# Patient Record
Sex: MALE | Race: ASIAN | URBAN - METROPOLITAN AREA
[De-identification: names, ages, dates, MRNs, and addresses within clinical notes are randomized per-mention and may not be internally consistent; named-entity substitution may affect disease eponyms.]

---

## 2022-11-30 ENCOUNTER — OFFICE (OUTPATIENT)
Dept: URBAN - METROPOLITAN AREA CLINIC 103 | Facility: CLINIC | Age: 67
Setting detail: OPHTHALMOLOGY
End: 2022-11-30
Payer: COMMERCIAL

## 2022-11-30 DIAGNOSIS — H25.13: ICD-10-CM

## 2022-11-30 DIAGNOSIS — H35.033: ICD-10-CM

## 2022-11-30 DIAGNOSIS — H17.9: ICD-10-CM

## 2022-11-30 DIAGNOSIS — E11.9: ICD-10-CM

## 2022-11-30 PROCEDURE — 92014 COMPRE OPH EXAM EST PT 1/>: CPT | Performed by: OPHTHALMOLOGY

## 2022-11-30 PROCEDURE — 92250 FUNDUS PHOTOGRAPHY W/I&R: CPT | Performed by: OPHTHALMOLOGY

## 2022-11-30 ASSESSMENT — REFRACTION_CURRENTRX
OD_AXIS: 094
OD_CYLINDER: -1.75
OS_VPRISM_DIRECTION: BF
OD_OVR_VA: 20/
OS_AXIS: 098
OS_SPHERE: +3.00
OS_CYLINDER: -2.50
OD_VPRISM_DIRECTION: BF
OS_ADD: +2.75
OS_OVR_VA: 20/
OD_SPHERE: +3.00
OD_ADD: +2.75

## 2022-11-30 ASSESSMENT — SPHEQUIV_DERIVED
OD_SPHEQUIV: 1.875
OS_SPHEQUIV: 2.625
OD_SPHEQUIV: 2.375
OS_SPHEQUIV: 1.75

## 2022-11-30 ASSESSMENT — REFRACTION_MANIFEST
OD_VA1: 20/20-
OD_AXIS: 100
OD_VA2: 20/20-
OD_CYLINDER: -0.75
OS_VA1: 20/20-
OS_CYLINDER: -2.50
OS_AXIS: 099
OD_ADD: +2.00
OS_VA2: 20/20-
OS_ADD: +2.00
OD_SPHERE: +2.25
OS_SPHERE: +3.00

## 2022-11-30 ASSESSMENT — CONFRONTATIONAL VISUAL FIELD TEST (CVF)
OD_FINDINGS: FULL
OS_FINDINGS: FULL

## 2022-11-30 ASSESSMENT — TONOMETRY
OD_IOP_MMHG: 12
OS_IOP_MMHG: 12

## 2022-11-30 ASSESSMENT — CORNEAL SURGICAL SCARRING: OD_SCARRING: PERIPHERAL ANTERIOR

## 2022-11-30 ASSESSMENT — REFRACTION_AUTOREFRACTION
OS_CYLINDER: -3.25
OD_SPHERE: +3.25
OS_AXIS: 093
OS_SPHERE: +4.25
OD_AXIS: 097
OD_CYLINDER: -1.75

## 2022-11-30 ASSESSMENT — VISUAL ACUITY
OS_BCVA: 20/25-
OD_BCVA: 20/40

## 2024-01-30 ENCOUNTER — OFFICE (OUTPATIENT)
Dept: URBAN - METROPOLITAN AREA CLINIC 103 | Facility: CLINIC | Age: 69
Setting detail: OPHTHALMOLOGY
End: 2024-01-30
Payer: COMMERCIAL

## 2024-01-30 DIAGNOSIS — E11.3293: ICD-10-CM

## 2024-01-30 DIAGNOSIS — H11.042: ICD-10-CM

## 2024-01-30 DIAGNOSIS — H25.043: ICD-10-CM

## 2024-01-30 DIAGNOSIS — H35.033: ICD-10-CM

## 2024-01-30 DIAGNOSIS — H17.9: ICD-10-CM

## 2024-01-30 DIAGNOSIS — H25.89: ICD-10-CM

## 2024-01-30 DIAGNOSIS — H25.13: ICD-10-CM

## 2024-01-30 PROCEDURE — 92250 FUNDUS PHOTOGRAPHY W/I&R: CPT | Performed by: OPHTHALMOLOGY

## 2024-01-30 PROCEDURE — 92014 COMPRE OPH EXAM EST PT 1/>: CPT | Performed by: OPHTHALMOLOGY

## 2024-01-30 ASSESSMENT — REFRACTION_CURRENTRX
OS_OVR_VA: 20/
OD_ADD: +2.75
OD_OVR_VA: 20/
OD_VPRISM_DIRECTION: BF
OD_AXIS: 090
OS_CYLINDER: -2.50
OD_CYLINDER: -2.00
OS_VPRISM_DIRECTION: BF
OD_SPHERE: +3.00
OS_SPHERE: +3.00
OS_ADD: +2.75
OS_AXIS: 092

## 2024-01-30 ASSESSMENT — REFRACTION_AUTOREFRACTION
OD_CYLINDER: -2.00
OD_AXIS: 094
OD_SPHERE: +3.75
OS_CYLINDER: -3.25
OS_SPHERE: +4.50
OS_AXIS: 098

## 2024-01-30 ASSESSMENT — SPHEQUIV_DERIVED
OS_SPHEQUIV: 2.875
OD_SPHEQUIV: 1.875
OS_SPHEQUIV: 1.75
OD_SPHEQUIV: 2.75

## 2024-01-30 ASSESSMENT — REFRACTION_MANIFEST
OD_AXIS: 100
OD_VA2: 20/20-
OS_SPHERE: +3.00
OS_VA2: 20/20-
OD_SPHERE: +2.25
OD_ADD: +2.00
OD_VA1: 20/20-
OS_VA1: 20/20-
OS_CYLINDER: -2.50
OS_ADD: +2.00
OD_CYLINDER: -0.75
OS_AXIS: 099

## 2024-01-30 ASSESSMENT — CORNEAL SURGICAL SCARRING: OD_SCARRING: PERIPHERAL ANTERIOR

## 2024-01-30 ASSESSMENT — CORNEAL PTERYGIUM: OS_PTERYGIUM: 1MM

## 2024-03-19 ENCOUNTER — OFFICE (OUTPATIENT)
Dept: URBAN - METROPOLITAN AREA CLINIC 103 | Facility: CLINIC | Age: 69
Setting detail: OPHTHALMOLOGY
End: 2024-03-19
Payer: COMMERCIAL

## 2024-03-19 DIAGNOSIS — E11.3293: ICD-10-CM

## 2024-03-19 DIAGNOSIS — H25.13: ICD-10-CM

## 2024-03-19 DIAGNOSIS — H25.043: ICD-10-CM

## 2024-03-19 DIAGNOSIS — H25.11: ICD-10-CM

## 2024-03-19 PROBLEM — H25.12 CATARACT NUCLEAR SCLEROSIS AGE RELATED; RIGHT EYE, LEFT EYE, BOTH EYES: Status: ACTIVE | Noted: 2024-03-19

## 2024-03-19 PROCEDURE — 92012 INTRM OPH EXAM EST PATIENT: CPT | Performed by: OPHTHALMOLOGY

## 2024-03-19 PROCEDURE — 92134 CPTRZ OPH DX IMG PST SGM RTA: CPT | Performed by: OPHTHALMOLOGY

## 2024-03-19 PROCEDURE — 92136 OPHTHALMIC BIOMETRY: CPT | Mod: 26,RT | Performed by: OPHTHALMOLOGY

## 2024-03-19 PROCEDURE — 92136 OPHTHALMIC BIOMETRY: CPT | Mod: TC | Performed by: OPHTHALMOLOGY

## 2024-03-19 ASSESSMENT — REFRACTION_CURRENTRX
OD_CYLINDER: -2.00
OS_VPRISM_DIRECTION: BF
OS_OVR_VA: 20/
OS_CYLINDER: -2.50
OD_OVR_VA: 20/
OS_AXIS: 092
OD_AXIS: 090
OD_SPHERE: +3.00
OD_ADD: +2.75
OS_SPHERE: +3.00
OS_ADD: +2.75
OD_VPRISM_DIRECTION: BF

## 2024-03-19 ASSESSMENT — SPHEQUIV_DERIVED
OD_SPHEQUIV: 1.875
OS_SPHEQUIV: 1.75

## 2024-03-19 ASSESSMENT — REFRACTION_MANIFEST
OS_VA2: 20/20-
OD_VA1: 20/20-
OS_SPHERE: +3.00
OD_VA2: 20/20-
OS_VA1: 20/20-
OS_ADD: +2.00
OS_CYLINDER: -2.50
OD_SPHERE: +2.25
OD_CYLINDER: -0.75
OD_AXIS: 100
OS_AXIS: 099
OD_ADD: +2.00

## 2024-05-13 ENCOUNTER — AMBULATORY SURGERY CENTER (OUTPATIENT)
Dept: URBAN - METROPOLITAN AREA SURGERY 4 | Facility: SURGERY | Age: 69
Setting detail: OPHTHALMOLOGY
End: 2024-05-13
Payer: COMMERCIAL

## 2024-05-13 DIAGNOSIS — H25.11: ICD-10-CM

## 2024-05-13 DIAGNOSIS — H21.501: ICD-10-CM

## 2024-05-13 PROCEDURE — 66982 XCAPSL CTRC RMVL CPLX WO ECP: CPT | Mod: RT | Performed by: OPHTHALMOLOGY

## 2024-05-14 ENCOUNTER — RX ONLY (RX ONLY)
Age: 69
End: 2024-05-14

## 2024-05-14 ENCOUNTER — OFFICE (OUTPATIENT)
Dept: URBAN - METROPOLITAN AREA CLINIC 103 | Facility: CLINIC | Age: 69
Setting detail: OPHTHALMOLOGY
End: 2024-05-14
Payer: COMMERCIAL

## 2024-05-14 DIAGNOSIS — Z96.1: ICD-10-CM

## 2024-05-14 PROCEDURE — 99024 POSTOP FOLLOW-UP VISIT: CPT | Performed by: OPTOMETRIST

## 2024-05-20 PROBLEM — Z00.00 ENCOUNTER FOR PREVENTIVE HEALTH EXAMINATION: Status: ACTIVE | Noted: 2024-05-20

## 2024-05-23 ENCOUNTER — APPOINTMENT (OUTPATIENT)
Dept: UROLOGY | Facility: CLINIC | Age: 69
End: 2024-05-23
Payer: MEDICARE

## 2024-05-23 ENCOUNTER — OFFICE (OUTPATIENT)
Dept: URBAN - METROPOLITAN AREA CLINIC 103 | Facility: CLINIC | Age: 69
Setting detail: OPHTHALMOLOGY
End: 2024-05-23
Payer: COMMERCIAL

## 2024-05-23 VITALS
HEART RATE: 79 BPM | OXYGEN SATURATION: 96 % | HEIGHT: 69 IN | SYSTOLIC BLOOD PRESSURE: 115 MMHG | DIASTOLIC BLOOD PRESSURE: 62 MMHG | BODY MASS INDEX: 28.88 KG/M2 | RESPIRATION RATE: 16 BRPM | WEIGHT: 195 LBS

## 2024-05-23 DIAGNOSIS — H25.12: ICD-10-CM

## 2024-05-23 PROBLEM — H25.042 POSTERIOR SUBCAPSULAR POLAR CATARACT; LEFT EYE: Status: ACTIVE | Noted: 2024-05-14

## 2024-05-23 PROCEDURE — 92136 OPHTHALMIC BIOMETRY: CPT | Mod: LT | Performed by: OPHTHALMOLOGY

## 2024-05-23 PROCEDURE — 99204 OFFICE O/P NEW MOD 45 MIN: CPT

## 2024-05-23 ASSESSMENT — CONFRONTATIONAL VISUAL FIELD TEST (CVF)
OS_FINDINGS: FULL
OD_FINDINGS: FULL

## 2024-05-23 NOTE — PHYSICAL EXAM
[General Appearance - Well Developed] : well developed [Heart Rate And Rhythm] : heart rate and rhythm were normal [Edema] : no peripheral edema [] : no respiratory distress [Abdomen Soft] : soft [Urinary Bladder Findings] : the bladder was normal on palpation [Normal Station and Gait] : the gait and station were normal for the patient's age [No Focal Deficits] : no focal deficits [de-identified] : DR: 3x3 bilateral nodules at base

## 2024-05-23 NOTE — ASSESSMENT
[FreeTextEntry1] : plan: repeat PSA F/T MRI prostate  I had a discussion with the patient regarding the implication of an elevated PSA and the need for further evaluation with a multiparametric MRI of the prostate with 3D mapping to facilitate subsequent fusion biopsy.    If suspicious areas are noted, the patient will need a Uronav (meaning fusion MRI-US biopsies) to biopsy the prostate using the transperineal approach.

## 2024-06-10 ENCOUNTER — AMBULATORY SURGERY CENTER (OUTPATIENT)
Dept: URBAN - METROPOLITAN AREA SURGERY 4 | Facility: SURGERY | Age: 69
Setting detail: OPHTHALMOLOGY
End: 2024-06-10
Payer: COMMERCIAL

## 2024-06-10 DIAGNOSIS — H52.212: ICD-10-CM

## 2024-06-10 DIAGNOSIS — H25.12: ICD-10-CM

## 2024-06-10 PROCEDURE — 66984 XCAPSL CTRC RMVL W/O ECP: CPT | Mod: 79,LT | Performed by: OPHTHALMOLOGY

## 2024-06-10 PROCEDURE — V2787 ASTIGMATISM-CORRECT FUNCTION: HCPCS | Performed by: OPHTHALMOLOGY

## 2024-06-11 ENCOUNTER — RX ONLY (RX ONLY)
Age: 69
End: 2024-06-11

## 2024-06-11 ENCOUNTER — APPOINTMENT (OUTPATIENT)
Dept: MRI IMAGING | Facility: CLINIC | Age: 69
End: 2024-06-11

## 2024-06-11 ENCOUNTER — OFFICE (OUTPATIENT)
Dept: URBAN - METROPOLITAN AREA CLINIC 103 | Facility: CLINIC | Age: 69
Setting detail: OPHTHALMOLOGY
End: 2024-06-11
Payer: COMMERCIAL

## 2024-06-11 ENCOUNTER — OUTPATIENT (OUTPATIENT)
Dept: OUTPATIENT SERVICES | Facility: HOSPITAL | Age: 69
LOS: 1 days | End: 2024-06-11
Payer: COMMERCIAL

## 2024-06-11 ENCOUNTER — RESULT REVIEW (OUTPATIENT)
Age: 69
End: 2024-06-11

## 2024-06-11 DIAGNOSIS — R68.89 OTHER GENERAL SYMPTOMS AND SIGNS: ICD-10-CM

## 2024-06-11 DIAGNOSIS — Z96.1: ICD-10-CM

## 2024-06-11 PROCEDURE — 76498P: CUSTOM | Mod: 26

## 2024-06-11 PROCEDURE — 72197 MRI PELVIS W/O & W/DYE: CPT

## 2024-06-11 PROCEDURE — 76498 UNLISTED MR PROCEDURE: CPT

## 2024-06-11 PROCEDURE — 99024 POSTOP FOLLOW-UP VISIT: CPT | Performed by: OPHTHALMOLOGY

## 2024-06-11 PROCEDURE — 72197 MRI PELVIS W/O & W/DYE: CPT | Mod: 26

## 2024-06-11 PROCEDURE — A9585: CPT

## 2024-06-11 ASSESSMENT — CONFRONTATIONAL VISUAL FIELD TEST (CVF)
OS_FINDINGS: FULL
OD_FINDINGS: FULL

## 2024-06-28 ENCOUNTER — APPOINTMENT (OUTPATIENT)
Dept: UROLOGY | Facility: CLINIC | Age: 69
End: 2024-06-28
Payer: MEDICARE

## 2024-06-28 ENCOUNTER — APPOINTMENT (OUTPATIENT)
Dept: ULTRASOUND IMAGING | Facility: CLINIC | Age: 69
End: 2024-06-28

## 2024-06-28 VITALS
WEIGHT: 195 LBS | HEIGHT: 69 IN | HEART RATE: 63 BPM | DIASTOLIC BLOOD PRESSURE: 75 MMHG | SYSTOLIC BLOOD PRESSURE: 122 MMHG | TEMPERATURE: 97.7 F | BODY MASS INDEX: 28.88 KG/M2

## 2024-06-28 DIAGNOSIS — R97.20 ELEVATED PROSTATE, SPECIFIC ANTIGEN [PSA]: ICD-10-CM

## 2024-06-28 DIAGNOSIS — R68.89 OTHER GENERAL SYMPTOMS AND SIGNS: ICD-10-CM

## 2024-06-28 PROCEDURE — 99214 OFFICE O/P EST MOD 30 MIN: CPT

## 2024-07-11 ENCOUNTER — OFFICE (OUTPATIENT)
Dept: URBAN - METROPOLITAN AREA CLINIC 103 | Facility: CLINIC | Age: 69
Setting detail: OPHTHALMOLOGY
End: 2024-07-11
Payer: COMMERCIAL

## 2024-07-11 DIAGNOSIS — Z96.1: ICD-10-CM

## 2024-07-11 PROCEDURE — 99024 POSTOP FOLLOW-UP VISIT: CPT | Performed by: OPHTHALMOLOGY

## 2024-07-11 ASSESSMENT — CONFRONTATIONAL VISUAL FIELD TEST (CVF)
OD_FINDINGS: FULL
OS_FINDINGS: FULL

## 2024-08-05 ENCOUNTER — NON-APPOINTMENT (OUTPATIENT)
Age: 69
End: 2024-08-05

## 2024-08-07 ENCOUNTER — TRANSCRIPTION ENCOUNTER (OUTPATIENT)
Age: 69
End: 2024-08-07

## 2024-08-12 ENCOUNTER — APPOINTMENT (OUTPATIENT)
Dept: UROLOGY | Facility: CLINIC | Age: 69
End: 2024-08-12

## 2024-08-15 RX ORDER — LEVOFLOXACIN 500 MG/1
500 TABLET, FILM COATED ORAL
Qty: 5 | Refills: 0 | Status: ACTIVE | COMMUNITY
Start: 2024-08-15 | End: 1900-01-01

## 2024-08-16 DIAGNOSIS — R68.89 OTHER GENERAL SYMPTOMS AND SIGNS: ICD-10-CM

## 2024-08-16 DIAGNOSIS — R97.20 ELEVATED PROSTATE, SPECIFIC ANTIGEN [PSA]: ICD-10-CM

## 2024-08-19 ENCOUNTER — NON-APPOINTMENT (OUTPATIENT)
Age: 69
End: 2024-08-19

## 2024-08-26 ENCOUNTER — RESULT REVIEW (OUTPATIENT)
Age: 69
End: 2024-08-26

## 2024-08-26 ENCOUNTER — APPOINTMENT (OUTPATIENT)
Dept: UROLOGY | Facility: HOSPITAL | Age: 69
End: 2024-08-26

## 2024-08-27 ENCOUNTER — NON-APPOINTMENT (OUTPATIENT)
Age: 69
End: 2024-08-27

## 2024-11-01 ENCOUNTER — APPOINTMENT (OUTPATIENT)
Dept: UROLOGY | Facility: CLINIC | Age: 69
End: 2024-11-01
Payer: MEDICARE

## 2024-11-01 VITALS
BODY MASS INDEX: 28.88 KG/M2 | TEMPERATURE: 98.2 F | SYSTOLIC BLOOD PRESSURE: 117 MMHG | HEIGHT: 69 IN | HEART RATE: 78 BPM | DIASTOLIC BLOOD PRESSURE: 69 MMHG | WEIGHT: 195 LBS

## 2024-11-01 DIAGNOSIS — R97.20 ELEVATED PROSTATE, SPECIFIC ANTIGEN [PSA]: ICD-10-CM

## 2024-11-01 DIAGNOSIS — R68.89 OTHER GENERAL SYMPTOMS AND SIGNS: ICD-10-CM

## 2024-11-01 PROCEDURE — 99214 OFFICE O/P EST MOD 30 MIN: CPT

## 2024-11-01 NOTE — HISTORY OF PRESENT ILLNESS
[FreeTextEntry1] : 69-year-old Chinese diabetic, HTN RA male with recently diagnosed elevated PSA of 7.29 ng/mL.  Denies family history of prostate cancer. possibly brother had bladder ca that he  from. Denies maternal history of breast cancer.  Denies recent UTI or Sx of fever, frequency, urgency, dysuria, hematuria, penile discharge. Denies prolonged bike rides/ trauma to perineum, Urethral instrumentation, or ejaculation prior to PSA lab draw.  [facility] / [facility] - unable to fill due to language barrier Does not take 5 alpha reductase inhibitor. Has never had prostate imaging.  Denies back pain, bone pain, weight loss. smoker: 50-year smoker blood thinner: none  2024: PSA=8 / %free=15% // MRI prostate: PS=40.5 cc / PSAD =0.18, no lesions. Multiple linear/wedge-shaped areas of T2 hypointense signal without associated restricted diffusion, likely inflammatory.  2024: TP Biopsy: all cores benign tissue.

## 2024-11-01 NOTE — ASSESSMENT
[FreeTextEntry1] : June 28, 2024: PSA=8 / %free=15% // MRI prostate: PS=40.5 cc / PSAD =0.18, no lesions. Multiple linear/wedge-shaped areas of T2 hypointense signal without associated restricted diffusion, likely inflammatory.  October 2024: TP Biopsy: all cores benign tissue.   plan: PSA 1 year

## 2024-11-01 NOTE — PHYSICAL EXAM
[General Appearance - Well Developed] : well developed [Heart Rate And Rhythm] : heart rate and rhythm were normal [Edema] : no peripheral edema [] : no respiratory distress [Abdomen Soft] : soft [Urinary Bladder Findings] : the bladder was normal on palpation [Normal Station and Gait] : the gait and station were normal for the patient's age [No Focal Deficits] : no focal deficits [de-identified] : DR: 3x3 bilateral nodules at base

## 2025-01-09 ENCOUNTER — OFFICE (OUTPATIENT)
Dept: URBAN - METROPOLITAN AREA CLINIC 103 | Facility: CLINIC | Age: 70
Setting detail: OPHTHALMOLOGY
End: 2025-01-09
Payer: COMMERCIAL

## 2025-01-09 DIAGNOSIS — E11.3293: ICD-10-CM

## 2025-01-09 DIAGNOSIS — H35.033: ICD-10-CM

## 2025-01-09 DIAGNOSIS — H17.9: ICD-10-CM

## 2025-01-09 DIAGNOSIS — H26.493: ICD-10-CM

## 2025-01-09 DIAGNOSIS — H11.042: ICD-10-CM

## 2025-01-09 PROCEDURE — 92014 COMPRE OPH EXAM EST PT 1/>: CPT | Performed by: OPHTHALMOLOGY

## 2025-01-09 PROCEDURE — 92250 FUNDUS PHOTOGRAPHY W/I&R: CPT | Performed by: OPHTHALMOLOGY

## 2025-01-09 ASSESSMENT — REFRACTION_AUTOREFRACTION
OD_CYLINDER: -1.75
OS_CYLINDER: -1.25
OS_SPHERE: +2.25
OD_SPHERE: +2.00
OD_AXIS: 099
OS_AXIS: 076

## 2025-01-09 ASSESSMENT — CONFRONTATIONAL VISUAL FIELD TEST (CVF)
OS_FINDINGS: FULL
OD_FINDINGS: FULL

## 2025-01-09 ASSESSMENT — KERATOMETRY
OS_K2POWER_DIOPTERS: 44.00
OS_K1POWER_DIOPTERS: 42.50
OS_AXISANGLE_DEGREES: 009
OD_K1POWER_DIOPTERS: 42.00
OD_K2POWER_DIOPTERS: 43.25
METHOD_AUTO_MANUAL: AUTO
OD_AXISANGLE_DEGREES: 017

## 2025-01-09 ASSESSMENT — REFRACTION_CURRENTRX
OS_AXIS: 092
OS_ADD: +2.75
OD_SPHERE: +3.00
OS_VPRISM_DIRECTION: BF
OS_CYLINDER: -2.50
OS_SPHERE: +3.00
OD_ADD: +2.75
OD_AXIS: 090
OD_VPRISM_DIRECTION: BF
OD_OVR_VA: 20/
OS_OVR_VA: 20/
OD_CYLINDER: -2.00

## 2025-01-09 ASSESSMENT — REFRACTION_MANIFEST
OD_ADD: +2.50
OS_ADD: +2.50
OS_VA1: 20/25+
OD_CYLINDER: -1.75
OD_SPHERE: +1.50
OD_VA1: 20/20-
OS_AXIS: 064
OD_VA2: 20/20-
OS_CYLINDER: -1.25
OS_SPHERE: +1.75
OD_AXIS: 099
OS_VA2: 20/20-

## 2025-01-09 ASSESSMENT — VISUAL ACUITY
OD_BCVA: 20/150
OS_BCVA: 20/30-2

## 2025-01-09 ASSESSMENT — CORNEAL SURGICAL SCARRING: OD_SCARRING: PERIPHERAL ANTERIOR

## 2025-01-09 ASSESSMENT — CORNEAL PTERYGIUM: OS_PTERYGIUM: 1MM

## 2025-02-11 ENCOUNTER — RX ONLY (RX ONLY)
Age: 70
End: 2025-02-11

## 2025-02-11 ENCOUNTER — OFFICE (OUTPATIENT)
Dept: URBAN - METROPOLITAN AREA CLINIC 103 | Facility: CLINIC | Age: 70
Setting detail: OPHTHALMOLOGY
End: 2025-02-11
Payer: COMMERCIAL

## 2025-02-11 DIAGNOSIS — H26.492: ICD-10-CM

## 2025-02-11 PROCEDURE — 66821 AFTER CATARACT LASER SURGERY: CPT | Mod: LT | Performed by: OPHTHALMOLOGY

## 2025-02-11 ASSESSMENT — REFRACTION_MANIFEST
OD_VA2: 20/20-
OS_AXIS: 064
OS_VA1: 20/25+
OS_CYLINDER: -1.25
OD_AXIS: 099
OS_VA2: 20/20-
OD_SPHERE: +1.50
OS_ADD: +2.50
OD_CYLINDER: -1.75
OD_ADD: +2.50
OS_SPHERE: +1.75
OD_VA1: 20/20-

## 2025-02-11 ASSESSMENT — REFRACTION_CURRENTRX
OD_OVR_VA: 20/
OS_CYLINDER: -2.50
OD_SPHERE: +3.00
OD_ADD: +2.75
OS_OVR_VA: 20/
OS_VPRISM_DIRECTION: BF
OD_AXIS: 090
OD_CYLINDER: -2.00
OS_SPHERE: +3.00
OS_AXIS: 092
OD_VPRISM_DIRECTION: BF
OS_ADD: +2.75

## 2025-02-11 ASSESSMENT — CORNEAL SURGICAL SCARRING: OD_SCARRING: PERIPHERAL ANTERIOR

## 2025-02-11 ASSESSMENT — CORNEAL PTERYGIUM: OS_PTERYGIUM: 1MM

## 2025-02-11 ASSESSMENT — KERATOMETRY
OD_K2POWER_DIOPTERS: 43.25
OS_K1POWER_DIOPTERS: 42.50
OD_AXISANGLE_DEGREES: 017
OS_K2POWER_DIOPTERS: 44.00
OD_K1POWER_DIOPTERS: 42.00
OS_AXISANGLE_DEGREES: 009
METHOD_AUTO_MANUAL: AUTO

## 2025-02-11 ASSESSMENT — VISUAL ACUITY
OS_BCVA: 20/30-2
OD_BCVA: 20/150

## 2025-02-11 ASSESSMENT — REFRACTION_AUTOREFRACTION
OD_SPHERE: +2.00
OS_CYLINDER: -1.25
OS_SPHERE: +2.25
OS_AXIS: 076
OD_CYLINDER: -1.75
OD_AXIS: 099

## 2025-02-18 ENCOUNTER — RX ONLY (RX ONLY)
Age: 70
End: 2025-02-18

## 2025-02-18 ENCOUNTER — OFFICE (OUTPATIENT)
Dept: URBAN - METROPOLITAN AREA CLINIC 103 | Facility: CLINIC | Age: 70
Setting detail: OPHTHALMOLOGY
End: 2025-02-18
Payer: COMMERCIAL

## 2025-02-18 DIAGNOSIS — H26.491: ICD-10-CM

## 2025-02-18 PROBLEM — H26.493 POSTERIOR CAPSULAR OPACIFICATION; RIGHT EYE, LEFT EYE, BOTH EYES: Status: ACTIVE | Noted: 2025-02-11

## 2025-02-18 PROBLEM — H26.492 POSTERIOR CAPSULAR OPACIFICATION; RIGHT EYE, LEFT EYE, BOTH EYES: Status: ACTIVE | Noted: 2025-02-11

## 2025-02-18 PROCEDURE — 66821 AFTER CATARACT LASER SURGERY: CPT | Mod: 79,RT | Performed by: OPHTHALMOLOGY

## 2025-02-18 ASSESSMENT — CONFRONTATIONAL VISUAL FIELD TEST (CVF)
OS_FINDINGS: FULL
OD_FINDINGS: FULL

## 2025-02-18 ASSESSMENT — KERATOMETRY
OS_K1POWER_DIOPTERS: 42.50
OD_K2POWER_DIOPTERS: 43.25
OS_K2POWER_DIOPTERS: 44.00
OD_K1POWER_DIOPTERS: 42.00
OS_AXISANGLE_DEGREES: 009
METHOD_AUTO_MANUAL: AUTO
OD_AXISANGLE_DEGREES: 017

## 2025-02-18 ASSESSMENT — REFRACTION_MANIFEST
OS_VA1: 20/25+
OD_AXIS: 099
OS_VA2: 20/20-
OS_AXIS: 064
OD_SPHERE: +1.50
OS_SPHERE: +1.75
OS_CYLINDER: -1.25
OS_ADD: +2.50
OD_VA2: 20/20-
OD_VA1: 20/20-
OD_ADD: +2.50
OD_CYLINDER: -1.75

## 2025-02-18 ASSESSMENT — REFRACTION_AUTOREFRACTION
OD_AXIS: 099
OD_SPHERE: +2.00
OS_CYLINDER: -1.25
OD_CYLINDER: -1.75
OS_AXIS: 076
OS_SPHERE: +2.25

## 2025-02-18 ASSESSMENT — CORNEAL SURGICAL SCARRING: OD_SCARRING: PERIPHERAL ANTERIOR

## 2025-02-18 ASSESSMENT — REFRACTION_CURRENTRX
OS_AXIS: 092
OS_ADD: +2.75
OS_SPHERE: +3.00
OD_OVR_VA: 20/
OS_OVR_VA: 20/
OD_AXIS: 090
OD_VPRISM_DIRECTION: BF
OS_CYLINDER: -2.50
OS_VPRISM_DIRECTION: BF
OD_ADD: +2.75
OD_SPHERE: +3.00
OD_CYLINDER: -2.00

## 2025-02-18 ASSESSMENT — VISUAL ACUITY
OS_BCVA: 20/40
OD_BCVA: 20/150

## 2025-02-18 ASSESSMENT — CORNEAL PTERYGIUM: OS_PTERYGIUM: 1MM

## 2025-03-11 ENCOUNTER — APPOINTMENT (OUTPATIENT)
Dept: PULMONOLOGY | Facility: CLINIC | Age: 70
End: 2025-03-11
Payer: MEDICARE

## 2025-03-11 PROCEDURE — 94729 DIFFUSING CAPACITY: CPT | Mod: TC

## 2025-03-11 PROCEDURE — 94060 EVALUATION OF WHEEZING: CPT | Mod: TC

## 2025-03-11 PROCEDURE — 94727 GAS DIL/WSHOT DETER LNG VOL: CPT | Mod: TC

## 2025-03-11 RX ORDER — ALBUTEROL SULFATE 2.5 MG/3ML
(2.5 MG/3ML) SOLUTION RESPIRATORY (INHALATION)
Qty: 0 | Refills: 0 | Status: COMPLETED | OUTPATIENT
Start: 2025-03-11

## 2025-03-11 RX ADMIN — Medication 0 0.083%: at 00:00

## 2025-05-20 ENCOUNTER — OFFICE (OUTPATIENT)
Dept: URBAN - METROPOLITAN AREA CLINIC 103 | Facility: CLINIC | Age: 70
Setting detail: OPHTHALMOLOGY
End: 2025-05-20
Payer: COMMERCIAL

## 2025-05-20 DIAGNOSIS — H26.493: ICD-10-CM

## 2025-05-20 DIAGNOSIS — E11.3213: ICD-10-CM

## 2025-05-20 PROCEDURE — 92134 CPTRZ OPH DX IMG PST SGM RTA: CPT | Performed by: OPHTHALMOLOGY

## 2025-05-20 PROCEDURE — 92012 INTRM OPH EXAM EST PATIENT: CPT | Performed by: OPHTHALMOLOGY

## 2025-05-20 ASSESSMENT — REFRACTION_CURRENTRX
OD_OVR_VA: 20/
OD_AXIS: 090
OD_ADD: +2.75
OS_AXIS: 092
OS_SPHERE: +3.00
OS_VPRISM_DIRECTION: BF
OS_OVR_VA: 20/
OD_CYLINDER: -2.00
OD_SPHERE: +3.00
OS_ADD: +2.75
OS_CYLINDER: -2.50
OD_VPRISM_DIRECTION: BF

## 2025-05-20 ASSESSMENT — KERATOMETRY
OD_K2POWER_DIOPTERS: 43.25
OS_K1POWER_DIOPTERS: 42.50
OS_AXISANGLE_DEGREES: 009
OS_K2POWER_DIOPTERS: 44.00
OD_AXISANGLE_DEGREES: 017
METHOD_AUTO_MANUAL: AUTO
OD_K1POWER_DIOPTERS: 42.00

## 2025-05-20 ASSESSMENT — CORNEAL SURGICAL SCARRING: OD_SCARRING: PERIPHERAL ANTERIOR

## 2025-05-20 ASSESSMENT — REFRACTION_MANIFEST
OS_SPHERE: +1.75
OD_AXIS: 099
OD_CYLINDER: -1.75
OS_ADD: +2.50
OD_VA2: 20/20-
OD_ADD: +2.50
OS_AXIS: 064
OS_VA2: 20/20-
OD_SPHERE: +1.50
OD_VA1: 20/20-
OS_VA1: 20/25+
OS_CYLINDER: -1.25

## 2025-05-20 ASSESSMENT — TONOMETRY
OD_IOP_MMHG: 14
OS_IOP_MMHG: 12

## 2025-05-20 ASSESSMENT — REFRACTION_AUTOREFRACTION
OD_AXIS: 099
OS_SPHERE: +2.25
OD_CYLINDER: -1.75
OS_AXIS: 076
OD_SPHERE: +2.00
OS_CYLINDER: -1.25

## 2025-05-20 ASSESSMENT — CONFRONTATIONAL VISUAL FIELD TEST (CVF)
OS_FINDINGS: FULL
OD_FINDINGS: FULL

## 2025-05-20 ASSESSMENT — CORNEAL PTERYGIUM: OS_PTERYGIUM: 1MM

## 2025-05-20 ASSESSMENT — VISUAL ACUITY
OS_BCVA: 20/25
OD_BCVA: 20/40-1

## 2025-06-13 ENCOUNTER — OFFICE (OUTPATIENT)
Dept: URBAN - METROPOLITAN AREA CLINIC 103 | Facility: CLINIC | Age: 70
Setting detail: OPHTHALMOLOGY
End: 2025-06-13
Payer: COMMERCIAL

## 2025-06-13 DIAGNOSIS — H33.193: ICD-10-CM

## 2025-06-13 DIAGNOSIS — H35.033: ICD-10-CM

## 2025-06-13 DIAGNOSIS — H11.042: ICD-10-CM

## 2025-06-13 DIAGNOSIS — E11.9: ICD-10-CM

## 2025-06-13 PROCEDURE — 99213 OFFICE O/P EST LOW 20 MIN: CPT | Performed by: OPHTHALMOLOGY

## 2025-06-13 PROCEDURE — 92235 FLUORESCEIN ANGRPH MLTIFRAME: CPT | Performed by: OPHTHALMOLOGY

## 2025-06-13 PROCEDURE — 92134 CPTRZ OPH DX IMG PST SGM RTA: CPT | Performed by: OPHTHALMOLOGY

## 2025-06-13 ASSESSMENT — VISUAL ACUITY
OS_BCVA: 20/25
OD_BCVA: 20/30

## 2025-06-13 ASSESSMENT — KERATOMETRY
OS_K2POWER_DIOPTERS: 44.00
OD_K2POWER_DIOPTERS: 43.25
OD_K1POWER_DIOPTERS: 42.00
METHOD_AUTO_MANUAL: AUTO
OS_K1POWER_DIOPTERS: 42.50
OS_AXISANGLE_DEGREES: 009
OD_AXISANGLE_DEGREES: 017

## 2025-06-13 ASSESSMENT — REFRACTION_AUTOREFRACTION
OD_CYLINDER: -1.75
OD_AXIS: 099
OS_SPHERE: +2.25
OS_CYLINDER: -1.25
OS_AXIS: 076
OD_SPHERE: +2.00

## 2025-06-13 ASSESSMENT — CONFRONTATIONAL VISUAL FIELD TEST (CVF)
OS_FINDINGS: FULL
OD_FINDINGS: FULL

## 2025-06-13 ASSESSMENT — CORNEAL SURGICAL SCARRING: OD_SCARRING: PERIPHERAL ANTERIOR

## 2025-06-13 ASSESSMENT — CORNEAL PTERYGIUM: OS_PTERYGIUM: 1MM

## 2025-06-14 ENCOUNTER — NON-APPOINTMENT (OUTPATIENT)
Age: 70
End: 2025-06-14

## 2025-06-16 ENCOUNTER — APPOINTMENT (OUTPATIENT)
Dept: ORTHOPEDIC SURGERY | Facility: CLINIC | Age: 70
End: 2025-06-16
Payer: MEDICARE

## 2025-06-16 VITALS — HEIGHT: 69 IN | BODY MASS INDEX: 28.44 KG/M2 | WEIGHT: 192 LBS

## 2025-06-16 PROCEDURE — 99204 OFFICE O/P NEW MOD 45 MIN: CPT | Mod: 25

## 2025-06-16 PROCEDURE — 73030 X-RAY EXAM OF SHOULDER: CPT | Mod: LT

## 2025-06-16 PROCEDURE — 20611 DRAIN/INJ JOINT/BURSA W/US: CPT | Mod: LT
